# Patient Record
Sex: FEMALE | Race: WHITE | NOT HISPANIC OR LATINO | Employment: OTHER | URBAN - METROPOLITAN AREA
[De-identification: names, ages, dates, MRNs, and addresses within clinical notes are randomized per-mention and may not be internally consistent; named-entity substitution may affect disease eponyms.]

---

## 2017-06-13 ENCOUNTER — TRANSCRIBE ORDERS (OUTPATIENT)
Dept: ADMINISTRATIVE | Facility: HOSPITAL | Age: 62
End: 2017-06-13

## 2017-06-13 DIAGNOSIS — Z12.31 VISIT FOR SCREENING MAMMOGRAM: Primary | ICD-10-CM

## 2017-06-20 ENCOUNTER — HOSPITAL ENCOUNTER (OUTPATIENT)
Dept: BONE DENSITY | Facility: HOSPITAL | Age: 62
Discharge: HOME | End: 2017-06-20
Attending: OBSTETRICS & GYNECOLOGY

## 2017-06-20 ENCOUNTER — HOSPITAL ENCOUNTER (OUTPATIENT)
Dept: MAMMOGRAPHY | Facility: HOSPITAL | Age: 62
Discharge: HOME | End: 2017-06-20
Attending: OBSTETRICS & GYNECOLOGY | Admitting: OBSTETRICS & GYNECOLOGY

## 2017-06-20 ENCOUNTER — OFFICE VISIT (OUTPATIENT)
Dept: OBSTETRICS AND GYNECOLOGY | Facility: CLINIC | Age: 62
End: 2017-06-20

## 2017-06-20 VITALS
SYSTOLIC BLOOD PRESSURE: 124 MMHG | BODY MASS INDEX: 22.13 KG/M2 | DIASTOLIC BLOOD PRESSURE: 70 MMHG | HEIGHT: 67 IN | WEIGHT: 141 LBS

## 2017-06-20 DIAGNOSIS — Z12.31 VISIT FOR SCREENING MAMMOGRAM: ICD-10-CM

## 2017-06-20 DIAGNOSIS — Z13.9 SCREENING: ICD-10-CM

## 2017-06-20 DIAGNOSIS — M85.80 OSTEOPENIA: ICD-10-CM

## 2017-06-20 DIAGNOSIS — Z01.419 ENCOUNTER FOR GYNECOLOGICAL EXAMINATION WITHOUT ABNORMAL FINDING: Primary | ICD-10-CM

## 2017-06-20 DIAGNOSIS — Z12.4 CERVICAL CANCER SCREENING: ICD-10-CM

## 2017-06-20 LAB
BILIRUB BLD-MCNC: NEGATIVE MG/DL
CLARITY, POC: CLEAR
COLOR UR: YELLOW
GLUCOSE UR STRIP-MCNC: NEGATIVE MG/DL
KETONES UR QL: NEGATIVE
LEUKOCYTE EST, POC: NEGATIVE
NITRITE UR-MCNC: NEGATIVE MG/ML
PH UR: 5 [PH] (ref 5–8)
PROT UR STRIP-MCNC: NEGATIVE MG/DL
RBC # UR STRIP: NEGATIVE /UL
SP GR UR: 1 (ref 1–1.03)
UROBILINOGEN UR QL: NORMAL

## 2017-06-20 PROCEDURE — G0202 SCR MAMMO BI INCL CAD: HCPCS

## 2017-06-20 PROCEDURE — 99396 PREV VISIT EST AGE 40-64: CPT | Performed by: OBSTETRICS & GYNECOLOGY

## 2017-06-20 PROCEDURE — 77080 DXA BONE DENSITY AXIAL: CPT

## 2017-06-20 PROCEDURE — 81002 URINALYSIS NONAUTO W/O SCOPE: CPT | Performed by: OBSTETRICS & GYNECOLOGY

## 2017-06-20 RX ORDER — DEXTROAMPHETAMINE SACCHARATE, AMPHETAMINE ASPARTATE, DEXTROAMPHETAMINE SULFATE AND AMPHETAMINE SULFATE 7.5; 7.5; 7.5; 7.5 MG/1; MG/1; MG/1; MG/1
30 TABLET ORAL DAILY
COMMUNITY
End: 2021-08-24

## 2017-06-20 RX ORDER — ASCORBIC ACID 1000 MG
TABLET, EXTENDED RELEASE ORAL
COMMUNITY

## 2017-06-20 RX ORDER — FLUTICASONE PROPIONATE 50 MCG
2 SPRAY, SUSPENSION (ML) NASAL DAILY
COMMUNITY

## 2017-06-20 RX ORDER — LANOLIN ALCOHOL/MO/W.PET/CERES
1000 CREAM (GRAM) TOPICAL DAILY
COMMUNITY

## 2017-06-20 NOTE — PROGRESS NOTES
GYN Annual Exam     CC- Here for annual exam.     Mary Holt is a 61 y.o. female who presents for annual well woman exam. No intermenstrual bleeding, spotting, or discharge. Menopause at age: 50's. Hx of endometrial ablation. Vasomotor symptoms: no. Pt has no hx of HRT use. Colonoscopy last year. Scheduled for MMG today. Teary eyed discussing what to do with her aging mother.  OB History      Para Term  AB TAB SAB Ectopic Multiple Living    2 2 2       2          Current contraception: post menopausal status  History of abnormal Pap smear: no  History of abnormal mammogram: no  Family history of uterine, colon or ovarian cancer: no  Family history of breast cancer: yes - Maternal Aunt    Health Maintenance   Topic Date Due   • TDAP/TD VACCINES (1 - Tdap) 1974   • HEPATITIS C SCREENING  2017   • COLONOSCOPY  2017   • ZOSTER VACCINE  2017   • INFLUENZA VACCINE  2017   • MAMMOGRAM  2019   • DXA SCAN  2019   • PAP SMEAR  2020       Past Medical History:   Diagnosis Date   • Osteopenia        Past Surgical History:   Procedure Laterality Date   •  SECTION     • ENDOMETRIAL ABLATION           Current Outpatient Prescriptions:   •  amphetamine-dextroamphetamine (ADDERALL) 30 MG tablet, Take 30 mg by mouth Daily., Disp: , Rfl:   •  Ascorbic Acid (VITAMIN C ER) 1000 MG tablet controlled-release, Take  by mouth., Disp: , Rfl:   •  CALCIUM PO, Take  by mouth., Disp: , Rfl:   •  DiphenhydrAMINE HCl (ALLERGY MED PO), Take  by mouth., Disp: , Rfl:   •  fluticasone (FLONASE) 50 MCG/ACT nasal spray, 2 sprays into each nostril Daily., Disp: , Rfl:   •  MAGNESIUM PO, Take  by mouth., Disp: , Rfl:   •  Omega-3 Fatty Acids (FISH OIL PO), Take  by mouth., Disp: , Rfl:   •  vitamin B-12 (CYANOCOBALAMIN) 1000 MCG tablet, Take 1,000 mcg by mouth Daily., Disp: , Rfl:     No Known Allergies    Social History   Substance Use Topics   • Smoking status: Never Smoker   •  "Smokeless tobacco: Never Used   • Alcohol use Yes      Comment: occasionally       Family History   Problem Relation Age of Onset   • Hyperlipidemia Mother    • Breast cancer Maternal Aunt    • Ovarian cancer Neg Hx    • Uterine cancer Neg Hx    • Colon cancer Neg Hx        Review of Systems   Constitutional: Negative for unexpected weight change.   Gastrointestinal: Negative for abdominal distention.   Genitourinary: Negative for dyspareunia, pelvic pain and vaginal bleeding.       /70  Ht 67\" (170.2 cm)  Wt 141 lb (64 kg)  Breastfeeding? No  BMI 22.08 kg/m2    Physical Exam   Constitutional: She is oriented to person, place, and time. She appears well-developed and well-nourished.   HENT:   Mouth/Throat: Normal dentition. No dental caries.   Cardiovascular: Normal rate and regular rhythm.    Pulmonary/Chest: Effort normal and breath sounds normal. Right breast exhibits no inverted nipple, no mass, no nipple discharge, no skin change and no tenderness. Left breast exhibits no inverted nipple, no mass, no nipple discharge, no skin change and no tenderness.   Abdominal: Soft. She exhibits no distension and no mass. There is no tenderness.   Genitourinary: There is no rash, tenderness or lesion on the right labia. There is no rash, tenderness or lesion on the left labia. Uterus is not deviated, not enlarged, not fixed and not tender. Cervix exhibits no motion tenderness, no discharge and no friability. Right adnexum displays no mass, no tenderness and no fullness. Left adnexum displays no mass, no tenderness and no fullness. No tenderness or bleeding in the vagina. No vaginal discharge found.   Neurological: She is alert and oriented to person, place, and time.   Psychiatric: She has a normal mood and affect. Her behavior is normal. Judgment and thought content normal.   Vitals reviewed.         Assessment/Plan    1) GYN HM: Check pap smear.  MMG today.  2) : No PMPB. No HRT. Hx of endometrial " ablation  3) Bone health :Weight bearing exercise, dietary calcium recommendations and vitamin D reviewed.   4) Osteopenia: Last bone density 6/2015. Check repeat bone density.  5) Smoking Status: nonsmoker  6) Social: Teary eyed during interview discussing what to do with her aging mother.  7) Follow up prn and 1 year       Diagnoses and all orders for this visit:    Encounter for gynecological examination without abnormal finding    Screening  -     POC Urinalysis Dipstick    Osteopenia  -     DEXA Bone Density Axial; Future    Cervical cancer screening  -     PapIG, HPV, Rfx 16 / 18    Other orders  -     Ascorbic Acid (VITAMIN C ER) 1000 MG tablet controlled-release; Take  by mouth.  -     vitamin B-12 (CYANOCOBALAMIN) 1000 MCG tablet; Take 1,000 mcg by mouth Daily.  -     Omega-3 Fatty Acids (FISH OIL PO); Take  by mouth.  -     fluticasone (FLONASE) 50 MCG/ACT nasal spray; 2 sprays into each nostril Daily.  -     DiphenhydrAMINE HCl (ALLERGY MED PO); Take  by mouth.  -     MAGNESIUM PO; Take  by mouth.  -     CALCIUM PO; Take  by mouth.  -     amphetamine-dextroamphetamine (ADDERALL) 30 MG tablet; Take 30 mg by mouth Daily.        Amber Roper DO  6/25/2017  4:58 PM

## 2017-06-21 ENCOUNTER — APPOINTMENT (OUTPATIENT)
Dept: MAMMOGRAPHY | Facility: HOSPITAL | Age: 62
End: 2017-06-21
Attending: OBSTETRICS & GYNECOLOGY

## 2017-06-23 LAB
CYTOLOGIST CVX/VAG CYTO: NORMAL
CYTOLOGY CVX/VAG DOC THIN PREP: NORMAL
DX ICD CODE: NORMAL
HIV 1 & 2 AB SER-IMP: NORMAL
HPV I/H RISK 1 DNA CVX QL PROBE+SIG AMP: NEGATIVE
OTHER STN SPEC: NORMAL
PATH REPORT.FINAL DX SPEC: NORMAL
PATHOLOGIST CVX/VAG CYTO: NORMAL
STAT OF ADQ CVX/VAG CYTO-IMP: NORMAL

## 2017-06-25 PROBLEM — M85.80 OSTEOPENIA: Status: ACTIVE | Noted: 2017-06-25

## 2017-06-25 PROBLEM — F98.8 ADD (ATTENTION DEFICIT DISORDER): Status: ACTIVE | Noted: 2017-06-25

## 2018-07-17 ENCOUNTER — OFFICE VISIT (OUTPATIENT)
Dept: OBSTETRICS AND GYNECOLOGY | Facility: CLINIC | Age: 63
End: 2018-07-17

## 2018-07-17 VITALS
BODY MASS INDEX: 21.09 KG/M2 | HEIGHT: 67 IN | DIASTOLIC BLOOD PRESSURE: 78 MMHG | WEIGHT: 134.4 LBS | SYSTOLIC BLOOD PRESSURE: 122 MMHG

## 2018-07-17 DIAGNOSIS — Z13.9 SCREENING FOR CONDITION: ICD-10-CM

## 2018-07-17 DIAGNOSIS — Z01.419 PAP SMEAR, LOW-RISK: ICD-10-CM

## 2018-07-17 DIAGNOSIS — M85.80 OSTEOPENIA, UNSPECIFIED LOCATION: ICD-10-CM

## 2018-07-17 DIAGNOSIS — Z01.419 ENCOUNTER FOR GYNECOLOGICAL EXAMINATION WITHOUT ABNORMAL FINDING: Primary | ICD-10-CM

## 2018-07-17 PROCEDURE — 81002 URINALYSIS NONAUTO W/O SCOPE: CPT | Performed by: OBSTETRICS & GYNECOLOGY

## 2018-07-17 PROCEDURE — 99396 PREV VISIT EST AGE 40-64: CPT | Performed by: OBSTETRICS & GYNECOLOGY

## 2018-07-17 NOTE — PROGRESS NOTES
GYN Annual Exam     CC- Here for annual exam.     Mary Holt is a 62 y.o. female who presents for annual well woman exam. Menopause around age 50. No vasomotor symptoms. Hx of HRT but one now. Colonoscopy 2-3 years ago. Not currently sexually active.    OB History      Para Term  AB Living    2 2 2     2    SAB TAB Ectopic Molar Multiple Live Births              2          Current contraception: post menopausal status  History of abnormal Pap smear: yes - repeat pap smear normal  History of abnormal mammogram: no  Family history of uterine, colon or ovarian cancer: no  Family history of breast cancer: yes - paternal aunt in 60's    Health Maintenance   Topic Date Due   • ANNUAL PHYSICAL  1958   • TDAP/TD VACCINES (1 - Tdap) 1974   • ZOSTER VACCINE (1 of 2) 2005   • HEPATITIS C SCREENING  2017   • COLONOSCOPY  2017   • INFLUENZA VACCINE  2018   • MAMMOGRAM  2019   • DXA SCAN  2019   • PAP SMEAR  2020       Past Medical History:   Diagnosis Date   • Osteopenia        Past Surgical History:   Procedure Laterality Date   •  SECTION     • ENDOMETRIAL ABLATION           Current Outpatient Prescriptions:   •  amphetamine-dextroamphetamine (ADDERALL) 30 MG tablet, Take 30 mg by mouth Daily., Disp: , Rfl:   •  Ascorbic Acid (VITAMIN C ER) 1000 MG tablet controlled-release, Take  by mouth., Disp: , Rfl:   •  CALCIUM PO, Take  by mouth., Disp: , Rfl:   •  DiphenhydrAMINE HCl (ALLERGY MED PO), Take  by mouth., Disp: , Rfl:   •  fluticasone (FLONASE) 50 MCG/ACT nasal spray, 2 sprays into each nostril Daily., Disp: , Rfl:   •  MAGNESIUM PO, Take  by mouth., Disp: , Rfl:   •  Omega-3 Fatty Acids (FISH OIL PO), Take  by mouth., Disp: , Rfl:   •  vitamin B-12 (CYANOCOBALAMIN) 1000 MCG tablet, Take 1,000 mcg by mouth Daily., Disp: , Rfl:     No Known Allergies    Social History   Substance Use Topics   • Smoking status: Never Smoker   • Smokeless tobacco:  "Never Used   • Alcohol use Yes      Comment: occasionally       Family History   Problem Relation Age of Onset   • Hyperlipidemia Mother    • Lumbar disc disease Mother    • Breast cancer Maternal Aunt    • Diabetes Brother    • No Known Problems Son    • No Known Problems Daughter    • No Known Problems Brother    • Ovarian cancer Neg Hx    • Uterine cancer Neg Hx    • Colon cancer Neg Hx        Review of Systems   Gastrointestinal: Negative for abdominal distention and abdominal pain.   Genitourinary: Negative for menstrual problem, pelvic pain, vaginal bleeding, vaginal discharge and vaginal pain.       /78   Ht 170.2 cm (67\")   Wt 61 kg (134 lb 6.4 oz)   BMI 21.05 kg/m²     Physical Exam   Constitutional: She is oriented to person, place, and time. She appears well-developed and well-nourished.   HENT:   Mouth/Throat: Normal dentition. No dental caries.   Cardiovascular: Normal rate and regular rhythm.    Pulmonary/Chest: Effort normal and breath sounds normal. Right breast exhibits no inverted nipple, no mass, no nipple discharge, no skin change and no tenderness. Left breast exhibits no inverted nipple, no mass, no nipple discharge, no skin change and no tenderness.   Abdominal: Soft. She exhibits no distension and no mass. There is no tenderness.   Genitourinary: There is no rash, tenderness or lesion on the right labia. There is no rash, tenderness or lesion on the left labia. Uterus is not deviated, not enlarged, not fixed and not tender. Cervix exhibits no motion tenderness, no discharge and no friability. Right adnexum displays no mass, no tenderness and no fullness. Left adnexum displays no mass, no tenderness and no fullness. No tenderness or bleeding in the vagina. No vaginal discharge found.   Genitourinary Comments: Cervix noted to be flushed with back of vagina   Neurological: She is alert and oriented to person, place, and time.   Psychiatric: She has a normal mood and affect. Her behavior " is normal. Judgment and thought content normal.   Vitals reviewed.         Assessment/Plan    1) GYN HM: Check pap smear. Last MMG 6/2017. Repeat MMG ordered. SBE demonstrated and encouraged.  2) STD screening: declines  3) Bone health - Weight bearing exercise, dietary calcium recommendations and vitamin D reviewed. Bone density 6/2017 with osteopenia. No meds indicated at this time. Will repeat bone density next year.  4) Diet and Exercise discussed  5) Smoking Status: nonsmoker  6) Social: not currently sexually active or in a relationship  7) Follow up prn and 1 year       Diagnoses and all orders for this visit:    Encounter for gynecological examination without abnormal finding    Screening for condition  -     POC Urinalysis Dipstick        Amber Roper,   7/17/2018  8:45 AM

## 2018-07-20 ENCOUNTER — APPOINTMENT (OUTPATIENT)
Dept: MAMMOGRAPHY | Facility: HOSPITAL | Age: 63
End: 2018-07-20
Attending: OBSTETRICS & GYNECOLOGY

## 2018-07-20 LAB
CYTOLOGIST CVX/VAG CYTO: NORMAL
CYTOLOGY CVX/VAG DOC THIN PREP: NORMAL
DX ICD CODE: NORMAL
HIV 1 & 2 AB SER-IMP: NORMAL
HPV I/H RISK 1 DNA CVX QL PROBE+SIG AMP: NEGATIVE
OTHER STN SPEC: NORMAL
PATH REPORT.FINAL DX SPEC: NORMAL
STAT OF ADQ CVX/VAG CYTO-IMP: NORMAL

## 2019-07-15 ENCOUNTER — TRANSCRIBE ORDERS (OUTPATIENT)
Dept: ADMINISTRATIVE | Facility: HOSPITAL | Age: 64
End: 2019-07-15

## 2019-07-15 DIAGNOSIS — Z12.31 VISIT FOR SCREENING MAMMOGRAM: Primary | ICD-10-CM

## 2019-07-17 ENCOUNTER — HOSPITAL ENCOUNTER (OUTPATIENT)
Dept: MAMMOGRAPHY | Facility: HOSPITAL | Age: 64
Discharge: HOME | End: 2019-07-17
Admitting: OBSTETRICS & GYNECOLOGY

## 2019-07-17 DIAGNOSIS — Z12.31 VISIT FOR SCREENING MAMMOGRAM: ICD-10-CM

## 2019-07-17 PROCEDURE — 77067 SCR MAMMO BI INCL CAD: CPT

## 2019-07-23 ENCOUNTER — OFFICE VISIT (OUTPATIENT)
Dept: OBSTETRICS AND GYNECOLOGY | Facility: CLINIC | Age: 64
End: 2019-07-23

## 2019-07-23 VITALS
BODY MASS INDEX: 21.5 KG/M2 | SYSTOLIC BLOOD PRESSURE: 110 MMHG | HEIGHT: 67 IN | WEIGHT: 137 LBS | DIASTOLIC BLOOD PRESSURE: 70 MMHG

## 2019-07-23 DIAGNOSIS — Z01.419 WELL FEMALE EXAM WITH ROUTINE GYNECOLOGICAL EXAM: Primary | ICD-10-CM

## 2019-07-23 DIAGNOSIS — M85.80 OSTEOPENIA, SENILE: ICD-10-CM

## 2019-07-23 DIAGNOSIS — N94.10 FEMALE DYSPAREUNIA: ICD-10-CM

## 2019-07-23 LAB
BILIRUB BLD-MCNC: NEGATIVE MG/DL
CLARITY, POC: CLEAR
COLOR UR: YELLOW
GLUCOSE UR STRIP-MCNC: NEGATIVE MG/DL
KETONES UR QL: NEGATIVE
LEUKOCYTE EST, POC: NEGATIVE
NITRITE UR-MCNC: NEGATIVE MG/ML
PH UR: 6 [PH] (ref 5–8)
PROT UR STRIP-MCNC: NEGATIVE MG/DL
RBC # UR STRIP: NEGATIVE /UL
SP GR UR: 1.02 (ref 1–1.03)
UROBILINOGEN UR QL: NORMAL

## 2019-07-23 PROCEDURE — 99396 PREV VISIT EST AGE 40-64: CPT | Performed by: OBSTETRICS & GYNECOLOGY

## 2019-07-23 PROCEDURE — 81002 URINALYSIS NONAUTO W/O SCOPE: CPT | Performed by: OBSTETRICS & GYNECOLOGY

## 2019-07-23 NOTE — PROGRESS NOTES
GYN Annual Exam     CC- Here for annual exam.     Mary Holt is a 63 y.o. female who presents for annual well woman exam. Menopause around age 50. No vasomotor symptoms. Hx of HRT but none now. Colonoscopy 3-4 years ago. Complaining of dyspareunia.         OB History      Para Term  AB Living    2 2 2     2    SAB TAB Ectopic Molar Multiple Live Births              2          Current contraception: post menopausal status  History of abnormal Pap smear: yes - repeat pap smear normal  History of abnormal mammogram: no  Family history of uterine, colon or ovarian cancer: no  Family history of breast cancer: yes - paternal aunt in 60's        Health Maintenance   Topic Date Due   • ANNUAL PHYSICAL  1958   • TDAP/TD VACCINES (1 - Tdap) 1974   • ZOSTER VACCINE (1 of 2) 2005   • HEPATITIS C SCREENING  2017   • COLONOSCOPY  2017   • DXA SCAN  2019   • ANNUAL GYN EXAM  2019   • INFLUENZA VACCINE  2019   • MAMMOGRAM  2021   • PAP SMEAR  2021       Past Medical History:   Diagnosis Date   • Osteopenia        Past Surgical History:   Procedure Laterality Date   •  SECTION     • ENDOMETRIAL ABLATION           Current Outpatient Medications:   •  amphetamine-dextroamphetamine (ADDERALL) 30 MG tablet, Take 30 mg by mouth Daily., Disp: , Rfl:   •  Ascorbic Acid (VITAMIN C ER) 1000 MG tablet controlled-release, Take  by mouth., Disp: , Rfl:   •  CALCIUM PO, Take  by mouth., Disp: , Rfl:   •  DiphenhydrAMINE HCl (ALLERGY MED PO), Take  by mouth., Disp: , Rfl:   •  fluticasone (FLONASE) 50 MCG/ACT nasal spray, 2 sprays into each nostril Daily., Disp: , Rfl:   •  MAGNESIUM PO, Take  by mouth., Disp: , Rfl:   •  Omega-3 Fatty Acids (FISH OIL PO), Take  by mouth., Disp: , Rfl:   •  vitamin B-12 (CYANOCOBALAMIN) 1000 MCG tablet, Take 1,000 mcg by mouth Daily., Disp: , Rfl:     No Known Allergies    Social History     Tobacco Use   • Smoking status: Never  "Smoker   • Smokeless tobacco: Never Used   Substance Use Topics   • Alcohol use: Yes     Comment: occasionally   • Drug use: No       Family History   Problem Relation Age of Onset   • Hyperlipidemia Mother    • Lumbar disc disease Mother    • Breast cancer Maternal Aunt    • Diabetes Brother    • No Known Problems Son    • No Known Problems Daughter    • No Known Problems Brother    • Ovarian cancer Neg Hx    • Uterine cancer Neg Hx    • Colon cancer Neg Hx        Review of Systems   Constitutional: Negative for unexpected weight change.   Gastrointestinal: Negative for abdominal distention, abdominal pain, anal bleeding, blood in stool, constipation and diarrhea.   Genitourinary: Positive for dyspareunia. Negative for pelvic pain, vaginal bleeding, vaginal discharge and vaginal pain.       /70   Ht 170.2 cm (67.01\")   Wt 62.1 kg (137 lb)   BMI 21.45 kg/m²     Physical Exam   Constitutional: She is oriented to person, place, and time. She appears well-developed and well-nourished.   HENT:   Mouth/Throat: Normal dentition. No dental caries.   Cardiovascular: Normal rate and regular rhythm.   Pulmonary/Chest: Effort normal and breath sounds normal. Right breast exhibits no inverted nipple, no mass, no nipple discharge, no skin change and no tenderness. Left breast exhibits no inverted nipple, no mass, no nipple discharge, no skin change and no tenderness.   Abdominal: Soft. She exhibits no distension and no mass. There is no tenderness.   Genitourinary: There is no rash, tenderness or lesion on the right labia. There is no rash, tenderness or lesion on the left labia. Uterus is not deviated, not enlarged, not fixed and not tender. Cervix exhibits no motion tenderness, no discharge and no friability. Right adnexum displays no mass, no tenderness and no fullness. Left adnexum displays no mass, no tenderness and no fullness. No tenderness or bleeding in the vagina. No vaginal discharge found.   Neurological: " She is alert and oriented to person, place, and time.   Psychiatric: She has a normal mood and affect. Her behavior is normal. Judgment and thought content normal.   Vitals reviewed.         Assessment/Plan    1) GYN HM: LPS 7/2018 normal w neg Hr HPV. Last MMG 7/2019. SBE demonstrated and encouraged. Hx of endometrial ablation.  2) STD screening: declines  3) Bone health - Weight bearing exercise, dietary calcium recommendations and vitamin D reviewed. Bone density 6/2017 with osteopenia. No meds indicated at this time. Will repeat bone density.  4) Diet and Exercise discussed  5) Smoking Status: nonsmoker  6) Social: just started dating someone. Working part time at a - retired  7) Dyspareunia: Samples of Estradiol 10mcg tabs 2 x per week.    8) Follow up prn and 1 year              Diagnoses and all orders for this visit:    Well female exam with routine gynecological exam  -     POC Urinalysis Dipstick    Osteopenia, senile  -     DEXA Bone Density Axial; Future    Female dyspareunia        Amber Roper DO  7/23/2019  9:14 AM

## 2019-08-07 ENCOUNTER — APPOINTMENT (OUTPATIENT)
Dept: BONE DENSITY | Facility: HOSPITAL | Age: 64
End: 2019-08-07

## 2019-08-20 ENCOUNTER — APPOINTMENT (OUTPATIENT)
Dept: BONE DENSITY | Facility: HOSPITAL | Age: 64
End: 2019-08-20

## 2019-11-19 ENCOUNTER — HOSPITAL ENCOUNTER (OUTPATIENT)
Dept: BONE DENSITY | Facility: HOSPITAL | Age: 64
Discharge: HOME | End: 2019-11-19
Admitting: OBSTETRICS & GYNECOLOGY

## 2019-11-19 DIAGNOSIS — M85.80 OSTEOPENIA, SENILE: ICD-10-CM

## 2019-11-19 PROCEDURE — 77080 DXA BONE DENSITY AXIAL: CPT

## 2020-08-11 ENCOUNTER — OFFICE VISIT (OUTPATIENT)
Dept: OBSTETRICS AND GYNECOLOGY | Facility: CLINIC | Age: 65
End: 2020-08-11

## 2020-08-11 VITALS
HEIGHT: 67 IN | SYSTOLIC BLOOD PRESSURE: 116 MMHG | WEIGHT: 135 LBS | BODY MASS INDEX: 21.19 KG/M2 | DIASTOLIC BLOOD PRESSURE: 68 MMHG

## 2020-08-11 DIAGNOSIS — Z11.51 SCREENING FOR HPV (HUMAN PAPILLOMAVIRUS): ICD-10-CM

## 2020-08-11 DIAGNOSIS — Z01.419 PAP SMEAR, LOW-RISK: Primary | ICD-10-CM

## 2020-08-11 DIAGNOSIS — Z01.419 WELL WOMAN EXAM: ICD-10-CM

## 2020-08-11 DIAGNOSIS — Z13.9 SCREENING FOR UNSPECIFIED CONDITION: ICD-10-CM

## 2020-08-11 LAB
BILIRUB BLD-MCNC: NEGATIVE MG/DL
CLARITY, POC: CLEAR
COLOR UR: YELLOW
GLUCOSE UR STRIP-MCNC: NEGATIVE MG/DL
KETONES UR QL: NEGATIVE
LEUKOCYTE EST, POC: NEGATIVE
NITRITE UR-MCNC: NEGATIVE MG/ML
PH UR: 6 [PH] (ref 5–8)
PROT UR STRIP-MCNC: NEGATIVE MG/DL
RBC # UR STRIP: NEGATIVE /UL
SP GR UR: 1.01 (ref 1–1.03)
UROBILINOGEN UR QL: NORMAL

## 2020-08-11 PROCEDURE — 99396 PREV VISIT EST AGE 40-64: CPT | Performed by: OBSTETRICS & GYNECOLOGY

## 2020-08-11 PROCEDURE — 81002 URINALYSIS NONAUTO W/O SCOPE: CPT | Performed by: OBSTETRICS & GYNECOLOGY

## 2020-08-11 NOTE — PROGRESS NOTES
GYN Annual Exam     CC- Here for annual exam.     Mary Holt is a 64 y.o. female who presents for annual well woman exam. Menopause around age 50. No vasomotor symptoms. Hx of HRT but none now. Colonoscopy 3-4 years ago. Complaining of dyspareunia. Pt is in a new relationship.     OB History        2    Para   2    Term   2            AB        Living   2       SAB        TAB        Ectopic        Molar        Multiple        Live Births   2                Current contraception: post menopausal status  History of abnormal Pap smear: yes - repeat pap smear normal  History of abnormal mammogram: no  Family history of uterine, colon or ovarian cancer: no  Family history of breast cancer: yes - paternal aunt in 60's    Health Maintenance   Topic Date Due   • ANNUAL PHYSICAL  1958   • TDAP/TD VACCINES (1 - Tdap) 1966   • ZOSTER VACCINE (1 of 2) 2005   • HEPATITIS C SCREENING  2017   • COLONOSCOPY  2017   • INFLUENZA VACCINE  2020   • MAMMOGRAM  2021   • PAP SMEAR  2021   • Annual Gynecologic Pelvic and Breast Exam  2021   • DXA SCAN  2021       Past Medical History:   Diagnosis Date   • Osteopenia        Past Surgical History:   Procedure Laterality Date   •  SECTION     • ENDOMETRIAL ABLATION           Current Outpatient Medications:   •  amoxicillin-clavulanate (AUGMENTIN) 875-125 MG per tablet, Take 1 tablet by mouth 2 (Two) Times a Day., Disp: 20 tablet, Rfl: 0  •  amphetamine-dextroamphetamine (ADDERALL) 30 MG tablet, Take 30 mg by mouth Daily., Disp: , Rfl:   •  Ascorbic Acid (VITAMIN C ER) 1000 MG tablet controlled-release, Take  by mouth., Disp: , Rfl:   •  CALCIUM PO, Take  by mouth., Disp: , Rfl:   •  DiphenhydrAMINE HCl (ALLERGY MED PO), Take  by mouth., Disp: , Rfl:   •  [START ON 2020] Estradiol (Imvexxy Maintenance Pack) 10 MCG insert, Insert 1 tablet into the vagina 2 (Two) Times a Week., Disp: 8 each, Rfl: 11  •   "fluticasone (FLONASE) 50 MCG/ACT nasal spray, 2 sprays into each nostril Daily., Disp: , Rfl:   •  Ginkgo Biloba (GINKOBA PO), Take  by mouth., Disp: , Rfl:   •  MAGNESIUM PO, Take  by mouth., Disp: , Rfl:   •  Omega-3 Fatty Acids (FISH OIL PO), Take  by mouth., Disp: , Rfl:   •  Vitamin A 3 MG (82960 UT) capsule, Take 10,000 Units by mouth Daily., Disp: , Rfl:   •  vitamin B-12 (CYANOCOBALAMIN) 1000 MCG tablet, Take 1,000 mcg by mouth Daily., Disp: , Rfl:     No Known Allergies    Social History     Tobacco Use   • Smoking status: Never Smoker   • Smokeless tobacco: Never Used   Substance Use Topics   • Alcohol use: Yes     Comment: occasionally   • Drug use: No       Family History   Problem Relation Age of Onset   • Hyperlipidemia Mother    • Lumbar disc disease Mother    • Breast cancer Maternal Aunt    • Diabetes Brother    • No Known Problems Son    • No Known Problems Daughter    • No Known Problems Brother    • Ovarian cancer Neg Hx    • Uterine cancer Neg Hx    • Colon cancer Neg Hx        Review of Systems   Constitutional: Negative for appetite change, chills, fatigue, fever and unexpected weight change.   Gastrointestinal: Negative for abdominal distention, abdominal pain, anal bleeding, blood in stool, constipation, diarrhea, nausea and vomiting.   Genitourinary: Positive for dyspareunia. Negative for dysuria, menstrual problem, pelvic pain, vaginal bleeding, vaginal discharge and vaginal pain.       /68   Ht 170.2 cm (67\")   Wt 61.2 kg (135 lb)   BMI 21.14 kg/m²     Physical Exam   Constitutional: She is oriented to person, place, and time. She appears well-developed and well-nourished.   HENT:   Mouth/Throat: Normal dentition. No dental caries.   Cardiovascular: Normal rate, regular rhythm and normal heart sounds.   Pulmonary/Chest: Effort normal and breath sounds normal. No stridor. No respiratory distress. She has no wheezes. Right breast exhibits no inverted nipple, no mass, no nipple " discharge, no skin change and no tenderness. Left breast exhibits no inverted nipple, no mass, no nipple discharge, no skin change and no tenderness.   Abdominal: Soft. She exhibits no distension and no mass. There is no tenderness.   Genitourinary: There is no rash, tenderness or lesion on the right labia. There is no rash, tenderness or lesion on the left labia. Uterus is not deviated, not enlarged, not fixed and not tender. Cervix exhibits no motion tenderness, no discharge and no friability. Right adnexum displays no mass, no tenderness and no fullness. Left adnexum displays no mass, no tenderness and no fullness. No tenderness or bleeding in the vagina. No vaginal discharge found.   Musculoskeletal: Normal range of motion. She exhibits no edema or tenderness.   Neurological: She is alert and oriented to person, place, and time. No cranial nerve deficit. Coordination normal.   Skin: Skin is warm. No rash noted. No erythema.   Psychiatric: She has a normal mood and affect. Her behavior is normal. Judgment and thought content normal.   Vitals reviewed.         Assessment/Plan    1) GYN HM: LPS 7/2018 normal w neg Hr HPV. Check pap smear today. Last MMG 7/2019. SBE demonstrated and encouraged.   2) : Hx of endometrial ablation. No vasomotor symptoms. No HRT.  3) Bone health - Weight bearing exercise, dietary calcium recommendations and vitamin D reviewed. Bone density 7/2019 with osteopenia. No meds indicated at this time.   4) Diet and Exercise discussed. Walks daily.  5) Smoking Status: nonsmoker  6) Social: just started dating someone new. Pt is a retied teacher. Daughter just got engaged.  7) Dyspareunia: Rx Imvexxy  8) Follow up prn and 1 year       Diagnoses and all orders for this visit:    Pap smear, low-risk  -     Pap IG, HPV-hr    Screening for unspecified condition  -     Cancel: POC Pregnancy, Urine  -     POC Urinalysis Dipstick    Well woman exam  -     Pap IG, HPV-hr  -     Mammo Screening  Bilateral With CAD; Future    Screening for HPV (human papillomavirus)  -     Pap IG, HPV-hr    Other orders  -     Estradiol (Imvexxy Maintenance Pack) 10 MCG insert; Insert 1 tablet into the vagina 2 (Two) Times a Week.        Amber Roper DO  8/12/2020  14:53

## 2020-08-14 LAB
CYTOLOGIST CVX/VAG CYTO: NORMAL
CYTOLOGY CVX/VAG DOC CYTO: NORMAL
CYTOLOGY CVX/VAG DOC THIN PREP: NORMAL
DX ICD CODE: NORMAL
HIV 1 & 2 AB SER-IMP: NORMAL
HPV I/H RISK 1 DNA CVX QL PROBE+SIG AMP: NEGATIVE
Lab: NORMAL
OTHER STN SPEC: NORMAL
STAT OF ADQ CVX/VAG CYTO-IMP: NORMAL

## 2020-10-13 ENCOUNTER — HOSPITAL ENCOUNTER (OUTPATIENT)
Dept: MAMMOGRAPHY | Facility: HOSPITAL | Age: 65
Discharge: HOME | End: 2020-10-13
Admitting: OBSTETRICS & GYNECOLOGY

## 2020-10-13 DIAGNOSIS — Z01.419 WELL WOMAN EXAM: ICD-10-CM

## 2020-10-13 PROCEDURE — 77063 BREAST TOMOSYNTHESIS BI: CPT

## 2020-10-13 PROCEDURE — 77067 SCR MAMMO BI INCL CAD: CPT

## 2021-03-22 ENCOUNTER — BULK ORDERING (OUTPATIENT)
Dept: CASE MANAGEMENT | Facility: OTHER | Age: 66
End: 2021-03-22

## 2021-03-22 DIAGNOSIS — Z23 IMMUNIZATION DUE: ICD-10-CM

## 2021-06-07 ENCOUNTER — TRANSCRIBE ORDERS (OUTPATIENT)
Dept: ADMINISTRATIVE | Facility: HOSPITAL | Age: 66
End: 2021-06-07

## 2021-06-07 DIAGNOSIS — Z12.31 VISIT FOR SCREENING MAMMOGRAM: Primary | ICD-10-CM

## 2021-08-24 ENCOUNTER — OFFICE VISIT (OUTPATIENT)
Dept: OBSTETRICS AND GYNECOLOGY | Facility: CLINIC | Age: 66
End: 2021-08-24

## 2021-08-24 VITALS
SYSTOLIC BLOOD PRESSURE: 108 MMHG | DIASTOLIC BLOOD PRESSURE: 62 MMHG | HEIGHT: 67 IN | BODY MASS INDEX: 21.91 KG/M2 | WEIGHT: 139.6 LBS

## 2021-08-24 DIAGNOSIS — M85.80 OSTEOPENIA, SENILE: ICD-10-CM

## 2021-08-24 DIAGNOSIS — Z13.9 SCREENING FOR CONDITION: ICD-10-CM

## 2021-08-24 DIAGNOSIS — Z01.419 ENCOUNTER FOR GYNECOLOGICAL EXAMINATION: ICD-10-CM

## 2021-08-24 DIAGNOSIS — R93.6 ABNORMAL FINDINGS ON DIAGNOSTIC IMAGING OF LIMBS: ICD-10-CM

## 2021-08-24 DIAGNOSIS — Z01.419 PAP SMEAR, LOW-RISK: Primary | ICD-10-CM

## 2021-08-24 LAB
BILIRUB BLD-MCNC: NEGATIVE MG/DL
CLARITY, POC: CLEAR
COLOR UR: YELLOW
GLUCOSE UR STRIP-MCNC: NEGATIVE MG/DL
KETONES UR QL: NEGATIVE
LEUKOCYTE EST, POC: NEGATIVE
NITRITE UR-MCNC: NEGATIVE MG/ML
PH UR: 6.5 [PH] (ref 5–8)
PROT UR STRIP-MCNC: NEGATIVE MG/DL
RBC # UR STRIP: NEGATIVE /UL
SP GR UR: 1 (ref 1–1.03)
UROBILINOGEN UR QL: NORMAL

## 2021-08-24 PROCEDURE — 81002 URINALYSIS NONAUTO W/O SCOPE: CPT | Performed by: OBSTETRICS & GYNECOLOGY

## 2021-08-24 PROCEDURE — G0101 CA SCREEN;PELVIC/BREAST EXAM: HCPCS | Performed by: OBSTETRICS & GYNECOLOGY

## 2021-08-24 RX ORDER — MONTELUKAST SODIUM 10 MG/1
TABLET ORAL
COMMUNITY
Start: 2021-08-12

## 2021-08-24 RX ORDER — DEXTROAMPHETAMINE SACCHARATE, AMPHETAMINE ASPARTATE, DEXTROAMPHETAMINE SULFATE AND AMPHETAMINE SULFATE 5; 5; 5; 5 MG/1; MG/1; MG/1; MG/1
1 TABLET ORAL DAILY
COMMUNITY
Start: 2021-07-28

## 2021-08-24 NOTE — PROGRESS NOTES
GYN Annual Exam     CC- Here for annual exam.     Mary Holt is a 65 y.o. female who presents for annual well woman exam. Menopause around age 50. No vasomotor symptoms. Hx of HRT but none now. Colonoscopy due next year.  Last MMG 10/2020. Last bone density . Pt is sexually active. Using OTC for dyspareunia with good results.     OB History        2    Para   2    Term   2            AB        Living   2       SAB        TAB        Ectopic        Molar        Multiple        Live Births   2                Current contraception: post menopausal status  History of abnormal Pap smear: yes - repeat pap smear normal  History of abnormal mammogram: no  Family history of uterine, colon or ovarian cancer: no  Family history of breast cancer: yes - paternal aunt in 60's    Health Maintenance   Topic Date Due   • COLORECTAL CANCER SCREENING  Never done   • TDAP/TD VACCINES (1 - Tdap) Never done   • ZOSTER VACCINE (1 of 2) Never done   • HEPATITIS C SCREENING  Never done   • ANNUAL WELLNESS VISIT  Never done   • Pneumococcal Vaccine 65+ (1 of 1 - PPSV23) Never done   • INFLUENZA VACCINE  10/01/2021   • DXA SCAN  2021   • MAMMOGRAM  10/13/2022   • PAP SMEAR  2023   • COVID-19 Vaccine  Completed       Past Medical History:   Diagnosis Date   • Osteopenia        Past Surgical History:   Procedure Laterality Date   •  SECTION     • ENDOMETRIAL ABLATION           Current Outpatient Medications:   •  amphetamine-dextroamphetamine (ADDERALL) 20 MG tablet, Take 1 tablet by mouth Daily., Disp: , Rfl:   •  Ascorbic Acid (VITAMIN C ER) 1000 MG tablet controlled-release, Take  by mouth., Disp: , Rfl:   •  CALCIUM PO, Take  by mouth., Disp: , Rfl:   •  DiphenhydrAMINE HCl (ALLERGY MED PO), Take  by mouth., Disp: , Rfl:   •  fluticasone (FLONASE) 50 MCG/ACT nasal spray, 2 sprays into each nostril Daily., Disp: , Rfl:   •  Ginkgo Biloba (GINKOBA PO), Take  by mouth., Disp: , Rfl:   •  MAGNESIUM PO,  "Take  by mouth., Disp: , Rfl:   •  montelukast (SINGULAIR) 10 MG tablet, TAKE 1 TABLET BY MOUTH EVERY DAY for allergies, Disp: , Rfl:   •  Omega-3 Fatty Acids (FISH OIL PO), Take  by mouth., Disp: , Rfl:   •  vitamin B-12 (CYANOCOBALAMIN) 1000 MCG tablet, Take 1,000 mcg by mouth Daily., Disp: , Rfl:     No Known Allergies    Social History     Tobacco Use   • Smoking status: Never Smoker   • Smokeless tobacco: Never Used   Substance Use Topics   • Alcohol use: Yes     Comment: occasionally   • Drug use: No       Family History   Problem Relation Age of Onset   • Hyperlipidemia Mother    • Lumbar disc disease Mother    • Breast cancer Maternal Aunt    • Diabetes Brother    • No Known Problems Son    • No Known Problems Daughter    • No Known Problems Brother    • Ovarian cancer Neg Hx    • Uterine cancer Neg Hx    • Colon cancer Neg Hx        Review of Systems    /62   Ht 170.2 cm (67\")   Wt 63.3 kg (139 lb 9.6 oz)   BMI 21.86 kg/m²     Physical Exam  Vitals reviewed.   Constitutional:       General: She is not in acute distress.     Appearance: Normal appearance. She is well-developed. She is not ill-appearing, toxic-appearing or diaphoretic.   HENT:      Mouth/Throat:      Dentition: Normal dentition. No dental caries.   Cardiovascular:      Rate and Rhythm: Normal rate and regular rhythm.      Heart sounds: Normal heart sounds.   Pulmonary:      Effort: Pulmonary effort is normal. No respiratory distress.      Breath sounds: Normal breath sounds. No stridor. No wheezing.   Chest:      Breasts:         Right: No inverted nipple, mass, nipple discharge, skin change or tenderness.         Left: No inverted nipple, mass, nipple discharge, skin change or tenderness.   Abdominal:      General: There is no distension.      Palpations: Abdomen is soft. There is no mass.      Tenderness: There is no abdominal tenderness.   Genitourinary:     General: Normal vulva.      Exam position: Lithotomy position.      " Labia:         Right: No rash, tenderness or lesion.         Left: No rash, tenderness or lesion.       Urethra: No prolapse, urethral pain, urethral swelling or urethral lesion.      Vagina: No vaginal discharge, tenderness or bleeding.      Cervix: No cervical motion tenderness, discharge or friability.      Uterus: Not deviated, not enlarged, not fixed and not tender.       Adnexa:         Right: No mass, tenderness or fullness.          Left: No mass, tenderness or fullness.        Rectum: No external hemorrhoid.   Musculoskeletal:         General: No tenderness. Normal range of motion.   Skin:     General: Skin is warm.      Findings: No erythema or rash.   Neurological:      Mental Status: She is alert and oriented to person, place, and time.      Cranial Nerves: No cranial nerve deficit.      Coordination: Coordination normal.   Psychiatric:         Mood and Affect: Mood normal.         Behavior: Behavior normal.         Thought Content: Thought content normal.         Judgment: Judgment normal.            Assessment/Plan      1) GYN HM:  Check pap smear today. Last MMG 10/2020. SBE demonstrated and encouraged.   2) : Hx of endometrial ablation. No vasomotor symptoms. No HRT.  3) Bone health - Weight bearing exercise, dietary calcium recommendations and vitamin D reviewed. Bone density 7/2019 with osteopenia. No meds indicated at this time. Check bone density.  4) Diet and Exercise discussed. Walks daily.  5) Smoking Status: nonsmoker  6) Social: dating x 1 year. 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 Daughter is .  7) Dyspareunia: Improved.  8) Follow up prn and 1 year       Diagnoses and all orders for this visit:    Encounter for gynecological examination    Screening for condition  -     POC Urinalysis Dipstick    Osteopenia, senile  -     DEXA Bone Density Axial; Future    Abnormal findings on diagnostic imaging of limbs   -     DEXA Bone Density Axial;  Future    Other orders  -     amphetamine-dextroamphetamine (ADDERALL) 20 MG tablet; Take 1 tablet by mouth Daily.  -     montelukast (SINGULAIR) 10 MG tablet; TAKE 1 TABLET BY MOUTH EVERY DAY for allergies        Amber Roper DO  8/24/2021  11:01 EDT

## 2021-08-26 LAB
CYTOLOGIST CVX/VAG CYTO: NORMAL
CYTOLOGY CVX/VAG DOC CYTO: NORMAL
CYTOLOGY CVX/VAG DOC THIN PREP: NORMAL
DX ICD CODE: NORMAL
HIV 1 & 2 AB SER-IMP: NORMAL
OTHER STN SPEC: NORMAL
STAT OF ADQ CVX/VAG CYTO-IMP: NORMAL

## 2021-10-15 ENCOUNTER — APPOINTMENT (OUTPATIENT)
Dept: MAMMOGRAPHY | Facility: HOSPITAL | Age: 66
End: 2021-10-15

## 2021-10-23 ENCOUNTER — HOSPITAL ENCOUNTER (OUTPATIENT)
Dept: MAMMOGRAPHY | Facility: HOSPITAL | Age: 66
Discharge: HOME | End: 2021-10-23
Admitting: OBSTETRICS & GYNECOLOGY

## 2021-10-23 DIAGNOSIS — Z12.31 VISIT FOR SCREENING MAMMOGRAM: ICD-10-CM

## 2021-10-23 PROCEDURE — 77067 SCR MAMMO BI INCL CAD: CPT

## 2021-10-23 PROCEDURE — 77063 BREAST TOMOSYNTHESIS BI: CPT

## 2022-02-14 ENCOUNTER — HOSPITAL ENCOUNTER (OUTPATIENT)
Dept: BONE DENSITY | Facility: HOSPITAL | Age: 67
Discharge: HOME | End: 2022-02-14
Admitting: OBSTETRICS & GYNECOLOGY

## 2022-02-14 DIAGNOSIS — M85.80 OSTEOPENIA, SENILE: ICD-10-CM

## 2022-02-14 DIAGNOSIS — R93.6 ABNORMAL FINDINGS ON DIAGNOSTIC IMAGING OF LIMBS: ICD-10-CM

## 2022-02-14 PROCEDURE — 77080 DXA BONE DENSITY AXIAL: CPT

## 2023-06-06 ENCOUNTER — TELEPHONE (OUTPATIENT)
Dept: OBSTETRICS AND GYNECOLOGY | Facility: CLINIC | Age: 68
End: 2023-06-06

## 2023-06-06 NOTE — TELEPHONE ENCOUNTER
Caller: Mary Holt    Relationship to patient: Self    Best call back number: [phone]    Patient is needing: patient is requesting orders for mammo and bone density.

## 2023-06-07 DIAGNOSIS — Z12.31 ENCOUNTER FOR SCREENING MAMMOGRAM FOR MALIGNANT NEOPLASM OF BREAST: Primary | ICD-10-CM

## 2023-06-07 DIAGNOSIS — Z78.0 ASYMPTOMATIC MENOPAUSE: ICD-10-CM

## 2023-09-29 ENCOUNTER — HOSPITAL ENCOUNTER (OUTPATIENT)
Dept: MAMMOGRAPHY | Facility: HOSPITAL | Age: 68
Discharge: HOME | End: 2023-09-29
Admitting: OBSTETRICS & GYNECOLOGY
Payer: MEDICARE

## 2023-09-29 DIAGNOSIS — Z12.31 ENCOUNTER FOR SCREENING MAMMOGRAM FOR MALIGNANT NEOPLASM OF BREAST: ICD-10-CM

## 2023-09-29 PROCEDURE — 77067 SCR MAMMO BI INCL CAD: CPT

## 2023-09-29 PROCEDURE — 77063 BREAST TOMOSYNTHESIS BI: CPT

## 2024-03-13 ENCOUNTER — HOSPITAL ENCOUNTER (OUTPATIENT)
Dept: BONE DENSITY | Facility: HOSPITAL | Age: 69
Discharge: HOME | End: 2024-03-13
Admitting: OBSTETRICS & GYNECOLOGY
Payer: MEDICARE

## 2024-03-13 DIAGNOSIS — Z78.0 ASYMPTOMATIC MENOPAUSE: ICD-10-CM

## 2024-03-13 PROCEDURE — 77080 DXA BONE DENSITY AXIAL: CPT

## 2024-03-14 NOTE — PROGRESS NOTES
PIP= DEXA shows osteopenia, however, she does not meet the criteria for treatment with osteoporosis medication. Rec daily calcium and vit D intake along with weight bearing exercises.There are recommendations for calcium intake of 1200 mg daily for postmenopausal women.  This calcium should preferably come from your diet because it is better absorbed. Vitamin D helps the body absorb calcium. What is not obtained from diet should be supplemented in small doses during the day with D at meals. Also weight-bearing exercise helps with bone density. Walking is a great example. All this can help reduce your risk of fracture if you have an accident or fall.  Repeat DEXA in 2-3 years.

## 2024-10-23 ENCOUNTER — TRANSCRIBE ORDERS (OUTPATIENT)
Dept: ADMINISTRATIVE | Facility: HOSPITAL | Age: 69
End: 2024-10-23
Payer: MEDICARE

## 2024-10-23 DIAGNOSIS — Z12.31 VISIT FOR SCREENING MAMMOGRAM: Primary | ICD-10-CM

## 2025-06-17 ENCOUNTER — HOSPITAL ENCOUNTER (OUTPATIENT)
Dept: MAMMOGRAPHY | Facility: HOSPITAL | Age: 70
Discharge: HOME | End: 2025-06-17
Admitting: STUDENT IN AN ORGANIZED HEALTH CARE EDUCATION/TRAINING PROGRAM
Payer: MEDICARE

## 2025-06-17 DIAGNOSIS — Z12.31 VISIT FOR SCREENING MAMMOGRAM: ICD-10-CM

## 2025-06-17 PROCEDURE — 77063 BREAST TOMOSYNTHESIS BI: CPT

## 2025-06-17 PROCEDURE — 77067 SCR MAMMO BI INCL CAD: CPT
